# Patient Record
Sex: MALE | Race: WHITE | URBAN - METROPOLITAN AREA
[De-identification: names, ages, dates, MRNs, and addresses within clinical notes are randomized per-mention and may not be internally consistent; named-entity substitution may affect disease eponyms.]

---

## 2021-04-15 ENCOUNTER — OFFICE VISIT (OUTPATIENT)
Dept: URBAN - METROPOLITAN AREA CLINIC 37 | Facility: CLINIC | Age: 76
End: 2021-04-15

## 2021-04-15 VITALS — HEIGHT: 65 IN | WEIGHT: 135 LBS | BODY MASS INDEX: 22.49 KG/M2

## 2021-04-15 RX ORDER — LISINOPRIL 20 MG/1
1 TABLET TABLET ORAL ONCE A DAY
Qty: 30 | Status: ACTIVE | COMMUNITY

## 2021-04-15 RX ORDER — METFORMIN HYDROCHLORIDE 500 MG/1
1 TABLET WITH A MEAL TABLET ORAL BID
Status: ACTIVE | COMMUNITY

## 2021-04-15 RX ORDER — SIMVASTATIN 20 MG/1
1 TABLET IN THE EVENING TABLET, FILM COATED ORAL ONCE A DAY
Qty: 30 | Status: ACTIVE | COMMUNITY

## 2021-04-15 RX ORDER — ERGOCALCIFEROL CAPSULES, 1.25 MG/1
1 CAPSULE CAPSULE ORAL QWEEK
Status: ACTIVE | COMMUNITY

## 2021-04-15 NOTE — HPI-MIGRATED HPI
Colorectal cancer screening : Patients denies -> rectal bleeding, change in bowel habits, constipation, diarrhea, or abdominal pain;   Colorectal cancer screening : Current bowel movement patterns -> ?;   Colorectal cancer screening : Patient is here for initial consultation for -> first time screening colonoscopy;   Colorectal cancer screening : Family history of GI malignancy: -> Denies;   Initial consultation : Patient is here for -> rectal bleeding  Onset of symptoms: patient admitted h/o of hemorrhoids Aggravating factors: Relieving factors: Medications tried: Tests/evaluations done previously: Colonoscopy in 2012;     Colorectal cancer screening : Denies dysphagia or odynophagia Currently taking anticoagulants/NSAIDs: ?;

## 2021-04-22 ENCOUNTER — OFFICE VISIT (OUTPATIENT)
Dept: URBAN - METROPOLITAN AREA CLINIC 37 | Facility: CLINIC | Age: 76
End: 2021-04-22

## 2021-04-22 VITALS — WEIGHT: 135 LBS | HEIGHT: 65 IN | BODY MASS INDEX: 22.49 KG/M2

## 2021-04-22 PROBLEM — 267434003: Status: ACTIVE | Noted: 2021-04-22

## 2021-04-22 PROBLEM — 313436004: Status: ACTIVE | Noted: 2021-04-22

## 2021-04-22 PROBLEM — 12063002: Status: ACTIVE | Noted: 2021-04-22

## 2021-04-22 PROBLEM — 710815001: Status: ACTIVE | Noted: 2021-04-22

## 2021-04-22 PROBLEM — 428283002: Status: ACTIVE | Noted: 2021-04-22

## 2021-04-22 PROBLEM — 59621000: Status: ACTIVE | Noted: 2021-04-22

## 2021-04-22 PROBLEM — 34713006: Status: ACTIVE | Noted: 2021-04-22

## 2021-04-22 RX ORDER — SODIUM SULFATE, POTASSIUM SULFATE, MAGNESIUM SULFATE 17.5; 3.13; 1.6 G/ML; G/ML; G/ML
AS DIRECTED SOLUTION, CONCENTRATE ORAL ONCE
Qty: 1 KIT | Refills: 0 | OUTPATIENT
Start: 2021-04-22

## 2021-04-22 RX ORDER — METFORMIN HYDROCHLORIDE 500 MG/1
1 TABLET WITH A MEAL TABLET ORAL BID
Status: ACTIVE | COMMUNITY

## 2021-04-22 RX ORDER — LISINOPRIL 20 MG/1
1 TABLET TABLET ORAL ONCE A DAY
Qty: 30 | Status: ACTIVE | COMMUNITY

## 2021-04-22 RX ORDER — HYDROCORTISONE ACETATE 25 MG/1
1 SUPPOSITORY SUPPOSITORY RECTAL QHS
Qty: 14 | Refills: 2 | OUTPATIENT
Start: 2021-04-22

## 2021-04-22 RX ORDER — ERGOCALCIFEROL CAPSULES, 1.25 MG/1
1 CAPSULE CAPSULE ORAL QWEEK
Status: ACTIVE | COMMUNITY

## 2021-04-22 RX ORDER — DOCUSATE SODIUM 100 MG/1
2 CAPSULES CAPSULE ORAL ONCE A DAY
Qty: 60 CAPSULE | Refills: 2 | OUTPATIENT
Start: 2021-04-22

## 2021-04-22 RX ORDER — SIMVASTATIN 20 MG/1
1 TABLET IN THE EVENING TABLET, FILM COATED ORAL ONCE A DAY
Qty: 30 | Status: ACTIVE | COMMUNITY

## 2021-04-22 NOTE — HPI-MIGRATED HPI
Colorectal cancer screening : Last colonoscopy was -> 9 years ago;   Colorectal cancer screening : Patients denies -> change in bowel habits, constipation, diarrhea, or abdominal pain Admits rectal bleeding as above;   Colorectal cancer screening : Current bowel movement patterns -> One soft BM daily;   Colorectal cancer screening : Family history of GI malignancy: -> Denies;   Colorectal cancer screening : Patient is here for -> high risk surveillance colonoscopy due to personal history of colonic polyps;   Initial consultation : Patient is here for -> rectal bleeding  Onset of symptoms: 2-3 weeks ago. Patient admitted h/o of hemorrhoids Charcteristics: BRB in toilet bowl and on wiping . He does not believe it does not cause by constipation   Aggravating factors: unknown Relieving factors: none Medications tried: none Tests/evaluations done previously: Colonoscopy in 2012;     Colorectal cancer screening : Denies dysphagia or odynophagia Currently taking anticoagulants/NSAIDs: No;

## 2021-04-24 ENCOUNTER — LAB OUTSIDE AN ENCOUNTER (OUTPATIENT)
Dept: URBAN - METROPOLITAN AREA CLINIC 37 | Facility: CLINIC | Age: 76
End: 2021-04-24

## 2021-04-27 ENCOUNTER — TELEPHONE ENCOUNTER (OUTPATIENT)
Dept: URBAN - METROPOLITAN AREA CLINIC 35 | Facility: CLINIC | Age: 76
End: 2021-04-27

## 2021-05-05 ENCOUNTER — OFFICE VISIT (OUTPATIENT)
Dept: URBAN - METROPOLITAN AREA SURGERY CENTER 8 | Facility: SURGERY CENTER | Age: 76
End: 2021-05-05

## 2021-05-05 ENCOUNTER — TELEPHONE ENCOUNTER (OUTPATIENT)
Dept: URBAN - METROPOLITAN AREA CLINIC 35 | Facility: CLINIC | Age: 76
End: 2021-05-05

## 2021-05-05 RX ORDER — TRAMADOL HYDROCHLORIDE AND ACETAMINOPHEN 37.5; 325 MG/1; MG/1
1 TABLET TABLET ORAL EVERY 8 HOURS
Qty: 15 TABLET | Refills: 0 | OUTPATIENT
Start: 2021-05-05

## 2021-05-26 PROBLEM — 724538004: Status: ACTIVE | Noted: 2021-05-26

## 2021-05-26 PROBLEM — 721704005: Status: ACTIVE | Noted: 2021-05-26

## 2021-05-26 PROBLEM — 92343006: Status: ACTIVE | Noted: 2021-05-26

## 2021-05-27 ENCOUNTER — OFFICE VISIT (OUTPATIENT)
Dept: URBAN - METROPOLITAN AREA CLINIC 37 | Facility: CLINIC | Age: 76
End: 2021-05-27

## 2021-05-27 VITALS — HEIGHT: 65 IN | WEIGHT: 135 LBS | BODY MASS INDEX: 22.49 KG/M2

## 2021-05-27 RX ORDER — HYDROCORTISONE ACETATE 25 MG/1
1 SUPPOSITORY SUPPOSITORY RECTAL QHS
Qty: 14 | Refills: 2 | Status: DISCONTINUED | COMMUNITY
Start: 2021-04-22

## 2021-05-27 RX ORDER — TRAMADOL HYDROCHLORIDE AND ACETAMINOPHEN 37.5; 325 MG/1; MG/1
1 TABLET TABLET ORAL EVERY 8 HOURS
Qty: 15 TABLET | Refills: 0 | Status: DISCONTINUED | COMMUNITY
Start: 2021-05-05

## 2021-05-27 RX ORDER — SODIUM SULFATE, POTASSIUM SULFATE, MAGNESIUM SULFATE 17.5; 3.13; 1.6 G/ML; G/ML; G/ML
AS DIRECTED SOLUTION, CONCENTRATE ORAL ONCE
Qty: 1 KIT | Refills: 0 | Status: DISCONTINUED | COMMUNITY
Start: 2021-04-22

## 2021-05-27 RX ORDER — METFORMIN HYDROCHLORIDE 500 MG/1
1 TABLET WITH A MEAL TABLET ORAL BID
Status: ACTIVE | COMMUNITY

## 2021-05-27 RX ORDER — SIMVASTATIN 20 MG/1
1 TABLET IN THE EVENING TABLET, FILM COATED ORAL ONCE A DAY
Qty: 30 | Status: ACTIVE | COMMUNITY

## 2021-05-27 RX ORDER — DOCUSATE SODIUM 100 MG/1
2 CAPSULES CAPSULE ORAL ONCE A DAY
Qty: 60 CAPSULE | Refills: 2 | Status: ACTIVE | COMMUNITY
Start: 2021-04-22

## 2021-05-27 RX ORDER — LISINOPRIL 20 MG/1
1 TABLET TABLET ORAL ONCE A DAY
Qty: 30 | Status: ACTIVE | COMMUNITY

## 2021-05-27 RX ORDER — ERGOCALCIFEROL CAPSULES, 1.25 MG/1
1 CAPSULE CAPSULE ORAL QWEEK
Status: ACTIVE | COMMUNITY

## 2021-05-27 NOTE — HPI-MIGRATED HPI
Post-op OV : After Colonoscopy on -> 05/05/2021, 2 polyps were detected and resected. Histology showed they were tubular adenoma polyps. There was few mild diverticulosis found in the colon. Non- bleeding grade II internal hemorrhoids and with ulcerated mucosa were found during retroflexion. Seven bands were successfully place on 5 hemorrhoids. Good bowel prep;   Post-op OV : Patient -> denies any new changes in his/her health status since last OV Current BM patterns: 1-2 BMs daily;   Post-op OV : Patient denies -> rectal bleeding, fever, nausea & vomiting since the procedure date;

## 2021-05-27 NOTE — EXAM-MIGRATED EXAMINATIONS
GENERAL APPEARANCE: - alert, in no acute distress, well developed, well nourished  , alert, in no acute distress, well developed, well nourished  , alert, in no acute distress, well developed, well nourished ;   EYES: - sclera anicteric bilaterally  , sclera anicteric bilaterally  , sclera anicteric bilaterally ;   NECK/THYROID: - neck supple, no thyromegaly , neck supple, no thyromegaly , neck supple, no cervical lymphadenopathy, no thyromegaly;   SKIN: - warm and dry, no  jaundice , warm and dry, no  jaundice , no suspicious lesions, warm and dry, no jaundice;   HEART: - no murmurs, regular rate and rhythm, S1, S2 normal  , no murmurs, regular rate and rhythm, S1, S2 normal  , no murmurs, regular rate and rhythm, S1, S2 normal ;   LUNGS: - clear to auscultation bilaterally, good air movement, no wheezes, rales, rhonchi  , clear to auscultation bilaterally, good air movement, no wheezes, rales, rhonchi  , clear to auscultation bilaterally, good air movement, no wheezes, rales, rhonchi ;   ABDOMEN: - bowel sounds present, no masses palpable, no organomegaly , no rebound tenderness, soft, nontender, nondistended  , bowel sounds present, no masses palpable, no organomegaly , no rebound tenderness, soft, nontender, nondistended  , bowel sounds present, no masses palpable, no organomegaly , no rebound tenderness, soft, nontender, nondistended ;   RECTAL: - deferred by patient  , deferred by patient  , deferred by patient ;   MUSCULOSKELETAL: - normal gait  , normal gait  ,  normal gait and posture;   EXTREMITIES: - no edema  , no edema  , no edema ;   NEUROLOGIC: - cranial nerves 2-12 grossly intact  , cranial nerves 2-12 grossly intact  , cranial nerves 2-12 grossly intact ;   DENTAL EXAM: -    ,   ;   BMI not documented -    ,   ;   Weight Assessment -    ,   ;

## 2022-04-30 ENCOUNTER — TELEPHONE ENCOUNTER (OUTPATIENT)
Dept: URBAN - METROPOLITAN AREA CLINIC 121 | Facility: CLINIC | Age: 77
End: 2022-04-30

## 2022-05-01 ENCOUNTER — TELEPHONE ENCOUNTER (OUTPATIENT)
Dept: URBAN - METROPOLITAN AREA CLINIC 121 | Facility: CLINIC | Age: 77
End: 2022-05-01

## 2022-05-01 RX ORDER — ACETAMINOPHEN 325 MG/1
TABLET, FILM COATED ORAL
Status: ACTIVE | COMMUNITY
Start: 2007-10-23